# Patient Record
Sex: FEMALE | Race: WHITE
[De-identification: names, ages, dates, MRNs, and addresses within clinical notes are randomized per-mention and may not be internally consistent; named-entity substitution may affect disease eponyms.]

---

## 2017-08-13 ENCOUNTER — HOSPITAL ENCOUNTER (INPATIENT)
Dept: HOSPITAL 47 - EC | Age: 72
LOS: 4 days | Discharge: HOME HEALTH | DRG: 603 | End: 2017-08-17
Payer: MEDICARE

## 2017-08-13 VITALS — BODY MASS INDEX: 25.2 KG/M2

## 2017-08-13 DIAGNOSIS — E78.5: ICD-10-CM

## 2017-08-13 DIAGNOSIS — L02.31: Primary | ICD-10-CM

## 2017-08-13 DIAGNOSIS — L03.317: ICD-10-CM

## 2017-08-13 DIAGNOSIS — Z88.1: ICD-10-CM

## 2017-08-13 DIAGNOSIS — R73.9: ICD-10-CM

## 2017-08-13 DIAGNOSIS — Z88.2: ICD-10-CM

## 2017-08-13 DIAGNOSIS — E03.9: ICD-10-CM

## 2017-08-13 DIAGNOSIS — I10: ICD-10-CM

## 2017-08-13 DIAGNOSIS — Z79.899: ICD-10-CM

## 2017-08-13 LAB
ALP SERPL-CCNC: 79 U/L (ref 38–126)
ALT SERPL-CCNC: 29 U/L (ref 9–52)
ANION GAP SERPL CALC-SCNC: 13 MMOL/L
APTT BLD: 23.4 SEC (ref 22–30)
AST SERPL-CCNC: 35 U/L (ref 14–36)
BASOPHILS # BLD AUTO: 0 K/UL (ref 0–0.2)
BASOPHILS NFR BLD AUTO: 0 %
BUN SERPL-SCNC: 9 MG/DL (ref 7–17)
CALCIUM SPEC-MCNC: 9.3 MG/DL (ref 8.4–10.2)
CH: 31.9
CHCM: 34.1
CHLORIDE SERPL-SCNC: 103 MMOL/L (ref 98–107)
CK SERPL-CCNC: 69 U/L (ref 30–135)
CO2 SERPL-SCNC: 23 MMOL/L (ref 22–30)
EOSINOPHIL # BLD AUTO: 0.1 K/UL (ref 0–0.7)
EOSINOPHIL NFR BLD AUTO: 1 %
ERYTHROCYTE [DISTWIDTH] IN BLOOD BY AUTOMATED COUNT: 4.1 M/UL (ref 3.8–5.4)
ERYTHROCYTE [DISTWIDTH] IN BLOOD: 12.1 % (ref 11.5–15.5)
GLUCOSE SERPL-MCNC: 129 MG/DL (ref 74–99)
HCT VFR BLD AUTO: 38.5 % (ref 34–46)
HDW: 2.47
HGB BLD-MCNC: 13.3 GM/DL (ref 11.4–16)
INR PPP: 1 (ref ?–1.2)
LUC NFR BLD AUTO: 2 %
LYMPHOCYTES # SPEC AUTO: 0.5 K/UL (ref 1–4.8)
LYMPHOCYTES NFR SPEC AUTO: 5 %
MAGNESIUM SPEC-SCNC: 1.8 MG/DL (ref 1.6–2.3)
MCH RBC QN AUTO: 32.4 PG (ref 25–35)
MCHC RBC AUTO-ENTMCNC: 34.4 G/DL (ref 31–37)
MCV RBC AUTO: 93.9 FL (ref 80–100)
MONOCYTES # BLD AUTO: 0.5 K/UL (ref 0–1)
MONOCYTES NFR BLD AUTO: 5 %
NEUTROPHILS # BLD AUTO: 9.3 K/UL (ref 1.3–7.7)
NEUTROPHILS NFR BLD AUTO: 88 %
NON-AFRICAN AMERICAN GFR(MDRD): >60
PHOSPHATE SERPL-MCNC: 2.7 MG/DL (ref 2.5–4.5)
POTASSIUM SERPL-SCNC: 4.5 MMOL/L (ref 3.5–5.1)
PROT SERPL-MCNC: 7.1 G/DL (ref 6.3–8.2)
PT BLD: 10.6 SEC (ref 9–12)
SODIUM SERPL-SCNC: 139 MMOL/L (ref 137–145)
WBC # BLD AUTO: 0.17 10*3/UL
WBC # BLD AUTO: 10.7 K/UL (ref 3.8–10.6)
WBC (PEROX): 10.92

## 2017-08-13 PROCEDURE — 83036 HEMOGLOBIN GLYCOSYLATED A1C: CPT

## 2017-08-13 PROCEDURE — 85730 THROMBOPLASTIN TIME PARTIAL: CPT

## 2017-08-13 PROCEDURE — 87075 CULTR BACTERIA EXCEPT BLOOD: CPT

## 2017-08-13 PROCEDURE — 85610 PROTHROMBIN TIME: CPT

## 2017-08-13 PROCEDURE — 82550 ASSAY OF CK (CPK): CPT

## 2017-08-13 PROCEDURE — 93005 ELECTROCARDIOGRAM TRACING: CPT

## 2017-08-13 PROCEDURE — 87040 BLOOD CULTURE FOR BACTERIA: CPT

## 2017-08-13 PROCEDURE — 87186 SC STD MICRODIL/AGAR DIL: CPT

## 2017-08-13 PROCEDURE — 82553 CREATINE MB FRACTION: CPT

## 2017-08-13 PROCEDURE — 80053 COMPREHEN METABOLIC PANEL: CPT

## 2017-08-13 PROCEDURE — 87077 CULTURE AEROBIC IDENTIFY: CPT

## 2017-08-13 PROCEDURE — 80048 BASIC METABOLIC PNL TOTAL CA: CPT

## 2017-08-13 PROCEDURE — 96375 TX/PRO/DX INJ NEW DRUG ADDON: CPT

## 2017-08-13 PROCEDURE — 85025 COMPLETE CBC W/AUTO DIFF WBC: CPT

## 2017-08-13 PROCEDURE — 83605 ASSAY OF LACTIC ACID: CPT

## 2017-08-13 PROCEDURE — 87205 SMEAR GRAM STAIN: CPT

## 2017-08-13 PROCEDURE — 80202 ASSAY OF VANCOMYCIN: CPT

## 2017-08-13 PROCEDURE — 84100 ASSAY OF PHOSPHORUS: CPT

## 2017-08-13 PROCEDURE — 96365 THER/PROPH/DIAG IV INF INIT: CPT

## 2017-08-13 PROCEDURE — 87070 CULTURE OTHR SPECIMN AEROBIC: CPT

## 2017-08-13 PROCEDURE — 99285 EMERGENCY DEPT VISIT HI MDM: CPT

## 2017-08-13 PROCEDURE — 83735 ASSAY OF MAGNESIUM: CPT

## 2017-08-13 PROCEDURE — 36415 COLL VENOUS BLD VENIPUNCTURE: CPT

## 2017-08-13 PROCEDURE — 96367 TX/PROPH/DG ADDL SEQ IV INF: CPT

## 2017-08-13 NOTE — ED
General Adult HPI





- General


Chief complaint: Recheck/Abnormal Lab/Rx


Stated complaint: Back Pain


Time Seen by Provider: 08/13/17 13:49


Source: patient, RN notes reviewed, old records reviewed


Mode of arrival: ambulatory


Limitations: no limitations





- History of Present Illness


Initial comments: 





This is a 72-year-old female here for evaluation.  This patient's presenting 

for evaluation of back pain, erythema.  Cellulitis.  Patient does have abscess, 

started on antibiotics yesterday, no drainage.  No fevers.  This increasing 

redness to the area.  She has been taking antibiotics as prescribed with what 

she thinks worsening symptoms.





- Related Data


 Home Medications











 Medication  Instructions  Recorded  Confirmed


 


Alendronate Sodium [Fosamax] 70 mg PO TU 08/13/17 08/13/17


 


Atorvastatin [Lipitor] 10 mg PO WILKES 08/13/17 08/13/17


 


Cholecalciferol [Vitamin D3] 1,000 unit PO DAILY 08/13/17 08/13/17


 


Levothyroxine Sodium [Synthroid] 100 mcg PO MOTUWETHFR 08/13/17 08/13/17


 


Sulfamethox-Tmp 800-160Mg [Bactrim 1 tab PO BID 08/13/17 08/13/17





-160 mg]   


 


amLODIPine BESYLATE/BENAZEPRIL 1 cap PO DAILY 08/13/17 08/13/17





[amLODIPine BESYLATE/BENAZEPRIL   





5-20 mg]   











 Allergies











Allergy/AdvReac Type Severity Reaction Status Date / Time


 


sulfamethoxazole AdvReac  Abdominal Verified 08/13/17 14:30





[From Bactrim]   Pain  


 


trimethoprim [From Bactrim] AdvReac  Abdominal Verified 08/13/17 14:30





   Pain  














Review of Systems


ROS Statement: 


Those systems with pertinent positive or pertinent negative responses have been 

documented in the HPI.





ROS Other: All systems not noted in ROS Statement are negative.





Past Medical History


Past Medical History: Hyperlipidemia, Thyroid Disorder


History of Any Multi-Drug Resistant Organisms: None Reported


Past Surgical History: Back Surgery, Hysterectomy


Past Psychological History: No Psychological Hx Reported


Smoking Status: Never smoker


Past Alcohol Use History: None Reported


Past Drug Use History: None Reported





General Exam





- General Exam Comments


Initial Comments: 





This severe cellulitis with erythema and warmth to right posterior back


Limitations: no limitations


General appearance: alert, in no apparent distress


Head exam: Present: atraumatic, normocephalic, normal inspection


Eye exam: Present: normal appearance, PERRL, EOMI.  Absent: scleral icterus, 

conjunctival injection, periorbital swelling


ENT exam: Present: normal exam, mucous membranes moist


Neck exam: Present: normal inspection.  Absent: tenderness, meningismus, 

lymphadenopathy


Respiratory exam: Present: normal lung sounds bilaterally.  Absent: respiratory 

distress, wheezes, rales, rhonchi, stridor


Cardiovascular Exam: Present: regular rate, normal rhythm, normal heart sounds.

  Absent: systolic murmur, diastolic murmur, rubs, gallop, clicks


GI/Abdominal exam: Present: soft, normal bowel sounds.  Absent: distended, 

tenderness, guarding, rebound, rigid


Extremities exam: Present: normal inspection, full ROM, normal capillary 

refill.  Absent: tenderness, pedal edema, joint swelling, calf tenderness


Back exam: Present: normal inspection


Neurological exam: Present: alert, oriented X3, CN II-XII intact


Psychiatric exam: Present: normal affect, normal mood


Skin exam: Present: warm, dry, intact, normal color.  Absent: rash





Course


 Vital Signs











  08/13/17 08/13/17





  13:29 14:40


 


Temperature 98.1 F 98.2 F


 


Pulse Rate 98 92


 


Respiratory 20 16





Rate  


 


Blood Pressure 146/68 137/65


 


O2 Sat by Pulse 99 99





Oximetry  














- Reevaluation(s)


Reevaluation #1: 





08/13/17 15:22


Patient is in no acute distress





EKG Findings





- EKG Comments:


EKG Findings:: EKG shows normal sinus at a rate of 99, , QRS 86, 





Medical Decision Making





- Medical Decision Making





72 female the ER for evaluation.  Patient was is here for evaluation of back 

pain.  Worsening infection of back.  Patient was started on Bactrim yesterday 

for this abscess.  Patient's abscess is growing in size, erythematous growing 

in size and spreading around her back.  Patient will be admitted for IV 

antibiotics and surgical evaluation





- Lab Data


Result diagrams: 


 08/13/17 14:30





 08/13/17 14:30


 Lab Results











  08/13/17 08/13/17 08/13/17 Range/Units





  14:30 14:30 14:30 


 


WBC   10.7 H   (3.8-10.6)  k/uL


 


RBC   4.10   (3.80-5.40)  m/uL


 


Hgb   13.3   (11.4-16.0)  gm/dL


 


Hct   38.5   (34.0-46.0)  %


 


MCV   93.9   (80.0-100.0)  fL


 


MCH   32.4   (25.0-35.0)  pg


 


MCHC   34.4   (31.0-37.0)  g/dL


 


RDW   12.1   (11.5-15.5)  %


 


Plt Count   210   (150-450)  k/uL


 


Neutrophils %   88   %


 


Lymphocytes %   5   %


 


Monocytes %   5   %


 


Eosinophils %   1   %


 


Basophils %   0   %


 


Neutrophils #   9.3 H   (1.3-7.7)  k/uL


 


Lymphocytes #   0.5 L   (1.0-4.8)  k/uL


 


Monocytes #   0.5   (0-1.0)  k/uL


 


Eosinophils #   0.1   (0-0.7)  k/uL


 


Basophils #   0.0   (0-0.2)  k/uL


 


PT     (9.0-12.0)  sec


 


INR     (<1.2)  


 


APTT     (22.0-30.0)  sec


 


Sodium    139  (137-145)  mmol/L


 


Potassium    4.5  (3.5-5.1)  mmol/L


 


Chloride    103  ()  mmol/L


 


Carbon Dioxide    23  (22-30)  mmol/L


 


Anion Gap    13  mmol/L


 


BUN    9  (7-17)  mg/dL


 


Creatinine    0.70  (0.52-1.04)  mg/dL


 


Est GFR (MDRD) Af Amer    >60  (>60 ml/min/1.73 sqM)  


 


Est GFR (MDRD) Non-Af    >60  (>60 ml/min/1.73 sqM)  


 


Glucose    129 H  (74-99)  mg/dL


 


Plasma Lactic Acid Ryland     (0.7-2.0)  mmol/L


 


Calcium    9.3  (8.4-10.2)  mg/dL


 


Phosphorus    2.7  (2.5-4.5)  mg/dL


 


Magnesium    1.8  (1.6-2.3)  mg/dL


 


Total Bilirubin    1.2  (0.2-1.3)  mg/dL


 


AST    35  (14-36)  U/L


 


ALT    29  (9-52)  U/L


 


Alkaline Phosphatase    79  ()  U/L


 


Total Creatine Kinase  69    ()  U/L


 


CK-MB (CK-2)  0.6    (0.0-2.4)  ng/mL


 


CK-MB (CK-2) Rel Index  0.9    


 


Total Protein    7.1  (6.3-8.2)  g/dL


 


Albumin    4.1  (3.5-5.0)  g/dL














  08/13/17 08/13/17 Range/Units





  14:30 14:30 


 


WBC    (3.8-10.6)  k/uL


 


RBC    (3.80-5.40)  m/uL


 


Hgb    (11.4-16.0)  gm/dL


 


Hct    (34.0-46.0)  %


 


MCV    (80.0-100.0)  fL


 


MCH    (25.0-35.0)  pg


 


MCHC    (31.0-37.0)  g/dL


 


RDW    (11.5-15.5)  %


 


Plt Count    (150-450)  k/uL


 


Neutrophils %    %


 


Lymphocytes %    %


 


Monocytes %    %


 


Eosinophils %    %


 


Basophils %    %


 


Neutrophils #    (1.3-7.7)  k/uL


 


Lymphocytes #    (1.0-4.8)  k/uL


 


Monocytes #    (0-1.0)  k/uL


 


Eosinophils #    (0-0.7)  k/uL


 


Basophils #    (0-0.2)  k/uL


 


PT   10.6  (9.0-12.0)  sec


 


INR   1.0  (<1.2)  


 


APTT   23.4  (22.0-30.0)  sec


 


Sodium    (137-145)  mmol/L


 


Potassium    (3.5-5.1)  mmol/L


 


Chloride    ()  mmol/L


 


Carbon Dioxide    (22-30)  mmol/L


 


Anion Gap    mmol/L


 


BUN    (7-17)  mg/dL


 


Creatinine    (0.52-1.04)  mg/dL


 


Est GFR (MDRD) Af Amer    (>60 ml/min/1.73 sqM)  


 


Est GFR (MDRD) Non-Af    (>60 ml/min/1.73 sqM)  


 


Glucose    (74-99)  mg/dL


 


Plasma Lactic Acid Ryland  1.6   (0.7-2.0)  mmol/L


 


Calcium    (8.4-10.2)  mg/dL


 


Phosphorus    (2.5-4.5)  mg/dL


 


Magnesium    (1.6-2.3)  mg/dL


 


Total Bilirubin    (0.2-1.3)  mg/dL


 


AST    (14-36)  U/L


 


ALT    (9-52)  U/L


 


Alkaline Phosphatase    ()  U/L


 


Total Creatine Kinase    ()  U/L


 


CK-MB (CK-2)    (0.0-2.4)  ng/mL


 


CK-MB (CK-2) Rel Index    


 


Total Protein    (6.3-8.2)  g/dL


 


Albumin    (3.5-5.0)  g/dL














Disposition


Clinical Impression: 


 Cellulitis, Failure of outpatient treatment





Disposition: ADMITTED AS IP TO THIS HOSP


Condition: Good


Referrals: 


Patrick Evans DO [Primary Care Provider] - 1-2 days

## 2017-08-14 LAB
ANION GAP SERPL CALC-SCNC: 8 MMOL/L
BASOPHILS # BLD AUTO: 0 K/UL (ref 0–0.2)
BASOPHILS NFR BLD AUTO: 0 %
BUN SERPL-SCNC: 9 MG/DL (ref 7–17)
CALCIUM SPEC-MCNC: 8.3 MG/DL (ref 8.4–10.2)
CH: 32.4
CHCM: 33.4
CHLORIDE SERPL-SCNC: 107 MMOL/L (ref 98–107)
CO2 SERPL-SCNC: 25 MMOL/L (ref 22–30)
EOSINOPHIL # BLD AUTO: 0.1 K/UL (ref 0–0.7)
EOSINOPHIL NFR BLD AUTO: 2 %
ERYTHROCYTE [DISTWIDTH] IN BLOOD BY AUTOMATED COUNT: 3.6 M/UL (ref 3.8–5.4)
ERYTHROCYTE [DISTWIDTH] IN BLOOD: 12.8 % (ref 11.5–15.5)
GLUCOSE SERPL-MCNC: 83 MG/DL (ref 74–99)
HCT VFR BLD AUTO: 35 % (ref 34–46)
HDW: 2.47
HEMOGLOBIN A1C: 5.6 % (ref 4.2–6.1)
HGB BLD-MCNC: 11.2 GM/DL (ref 11.4–16)
LUC NFR BLD AUTO: 2 %
LYMPHOCYTES # SPEC AUTO: 0.9 K/UL (ref 1–4.8)
LYMPHOCYTES NFR SPEC AUTO: 14 %
MCH RBC QN AUTO: 31 PG (ref 25–35)
MCHC RBC AUTO-ENTMCNC: 31.9 G/DL (ref 31–37)
MCV RBC AUTO: 97.4 FL (ref 80–100)
MONOCYTES # BLD AUTO: 0.4 K/UL (ref 0–1)
MONOCYTES NFR BLD AUTO: 7 %
NEUTROPHILS # BLD AUTO: 4.6 K/UL (ref 1.3–7.7)
NEUTROPHILS NFR BLD AUTO: 75 %
NON-AFRICAN AMERICAN GFR(MDRD): >60
POTASSIUM SERPL-SCNC: 3.6 MMOL/L (ref 3.5–5.1)
SODIUM SERPL-SCNC: 140 MMOL/L (ref 137–145)
WBC # BLD AUTO: 0.13 10*3/UL
WBC # BLD AUTO: 6.1 K/UL (ref 3.8–10.6)
WBC (PEROX): 6.3

## 2017-08-14 RX ADMIN — ENOXAPARIN SODIUM SCH MG: 40 INJECTION SUBCUTANEOUS at 08:18

## 2017-08-14 RX ADMIN — PANTOPRAZOLE SODIUM SCH MG: 40 TABLET, DELAYED RELEASE ORAL at 08:18

## 2017-08-14 RX ADMIN — THERA TABS SCH EACH: TAB at 12:43

## 2017-08-14 RX ADMIN — CEFAZOLIN SCH MLS/HR: 330 INJECTION, POWDER, FOR SOLUTION INTRAMUSCULAR; INTRAVENOUS at 12:43

## 2017-08-14 RX ADMIN — SODIUM CHLORIDE SCH MLS/HR: 9 INJECTION, SOLUTION INTRAVENOUS at 00:05

## 2017-08-14 RX ADMIN — HYDROCODONE BITARTRATE AND ACETAMINOPHEN PRN EACH: 5; 325 TABLET ORAL at 16:25

## 2017-08-14 RX ADMIN — HYDROCODONE BITARTRATE AND ACETAMINOPHEN PRN EACH: 5; 325 TABLET ORAL at 08:31

## 2017-08-14 RX ADMIN — LEVOTHYROXINE SODIUM SCH MCG: 100 TABLET ORAL at 06:04

## 2017-08-14 RX ADMIN — HYDROMORPHONE HYDROCHLORIDE PRN MG: 1 INJECTION, SOLUTION INTRAMUSCULAR; INTRAVENOUS; SUBCUTANEOUS at 03:06

## 2017-08-14 RX ADMIN — LISINOPRIL SCH MG: 20 TABLET ORAL at 08:18

## 2017-08-14 RX ADMIN — Medication SCH UNIT: at 08:18

## 2017-08-14 RX ADMIN — SODIUM CHLORIDE SCH MLS/HR: 9 INJECTION, SOLUTION INTRAVENOUS at 12:43

## 2017-08-14 RX ADMIN — SODIUM CHLORIDE SCH MLS/HR: 9 INJECTION, SOLUTION INTRAVENOUS at 23:52

## 2017-08-14 NOTE — P.GSCN
History of Present Illness


Consult date: 08/14/17


Requesting physician: Tonja Diaz


History of present illness: 





72-year-old female who presented on the day of admission to the emergency room 

to be evaluated for a chief complaint of developing pain with redness involving 

the right buttocks onset August 7 patient stated it had initially started out 

small there was some redness  went to a retreat noted that it did not improve.  

Patient stated while she was on the retreat denied fever chills but did note 

that there was a significant amount of tenderness with increased redness to the 

site with no improvement


    Came back this past Saturday August 12th the area became increasingly more 

tender red and swollen and hard involving the right buttock extending into the 

back   presented to the urgent care clinic who put the patient on Bactrim.  

Patient stated at the clinic they did do wound cultures.  Patient stated the 

Bactrim "made her feel sick"   became concerned presented to the emergency room 

with no noted improvement   the right buttocks cellulitis abscess area was 

increasing in size.  Patient denied any injury denied any prior episodes.  The 

right buttocks dressing moderate amount of soupy creamy secretions noted on the 

dressing no odor noted palpable firm abscess noted.  Area red positive 

tenderness.  Patient was seen in the emergency room white count was 10.7 

afebrile.  No skin rash noted.  Patient gives no significant past medical 

history is not a diabetic there is no significant past surgical history.





Patient does give a history of July this year on the left Nare developed a sore 

inside the nose "it spontaneously drained a large amount of nasal secretions. 

went away on its own "no further episodes





Review of Systems





Essentially unremarkable except as mentioned in the present illness





Past Medical History


Past Medical History: Hyperlipidemia, Hypertension, Thyroid Disorder


History of Any Multi-Drug Resistant Organisms: None Reported


Past Surgical History: Back Surgery, Hysterectomy


Past Anesthesia/Blood Transfusion Reactions: No Reported Reaction


Past Psychological History: No Psychological Hx Reported


Smoking Status: Never smoker


Past Alcohol Use History: None Reported


Past Drug Use History: None Reported





Medications and Allergies


 Home Medications











 Medication  Instructions  Recorded  Confirmed  Type


 


Alendronate Sodium [Fosamax] 70 mg PO TU 08/13/17 08/13/17 History


 


Atorvastatin [Lipitor] 10 mg PO WILKES 08/13/17 08/13/17 History


 


Cholecalciferol [Vitamin D3] 1,000 unit PO DAILY 08/13/17 08/13/17 History


 


Levothyroxine Sodium [Synthroid] 100 mcg PO MOTUWETHFR 08/13/17 08/13/17 History


 


Sulfamethox-Tmp 800-160Mg [Bactrim 1 tab PO BID 08/13/17 08/13/17 History





-160 mg]    


 


amLODIPine BESYLATE/BENAZEPRIL 1 cap PO DAILY 08/13/17 08/13/17 History





[amLODIPine BESYLATE/BENAZEPRIL    





5-20 mg]    











 Allergies











Allergy/AdvReac Type Severity Reaction Status Date / Time


 


sulfamethoxazole AdvReac  Abdominal Verified 08/13/17 14:30





[From Bactrim]   Pain  


 


trimethoprim [From Bactrim] AdvReac  Abdominal Verified 08/13/17 14:30





   Pain  














Surgical - Exam


 Vital Signs











Temp Pulse Resp BP Pulse Ox


 


 98.1 F   98   20   146/68   99 


 


 08/13/17 13:29  08/13/17 13:29  08/13/17 13:29  08/13/17 13:29  08/13/17 13:29














GENERAL APPEARANCE: 72-year-old female patient is alert, oriented, in no acute 

distress.  Pleasant cooperative


VITAL SIGNS: Reviewed


HEENT: Head is normocephalic and atraumatic. Pupils are equal and reactive. The 

nares are patent. Oropharynx is clear without lesions.


NECK: Supple without lymphadenopathy. Traches midline.


HEART: S1, S2. Regular rate and rhythm.  No murmur noted denying chest pain 

when questioning


LUNGS: No crackles or wheezes are heard.  Adequate air movement bilaterally on 

room air sats 94% no shortness of breath noted


ABDOMEN: Soft, nontender, nondistended with good bowel sounds. No peritoneal 

signs. No palpable organomegaly or masses.  No reports of nausea vomiting 

urinating no difficulty denying any burning on urination frequency urgency


EXTREMITIES: Normal skin color and turgor. No cyanosis, rash, ulceration, 

clubbing or edema. Radial pedal pulses are 2/4 bilaterally.


NEUROLOGICAL: No focal deficits. Strength and sensation are grossly intact.


Skin right buttocks extending into the sacral area firm red positive tenderness 

abscess with a moderate amount.  Drainage from the center of the wound 








Results





- Labs





 08/14/17 07:56





 08/14/17 07:56


 Abnormal Lab Results - Last 24 Hours (Table)











  08/13/17 08/13/17 08/14/17 Range/Units





  14:30 14:30 07:56 


 


WBC  10.7 H    (3.8-10.6)  k/uL


 


RBC    3.60 L  (3.80-5.40)  m/uL


 


Hgb    11.2 L  (11.4-16.0)  gm/dL


 


Neutrophils #  9.3 H    (1.3-7.7)  k/uL


 


Lymphocytes #  0.5 L   0.9 L  (1.0-4.8)  k/uL


 


Glucose   129 H   (74-99)  mg/dL


 


Calcium     (8.4-10.2)  mg/dL














  08/14/17 Range/Units





  07:56 


 


WBC   (3.8-10.6)  k/uL


 


RBC   (3.80-5.40)  m/uL


 


Hgb   (11.4-16.0)  gm/dL


 


Neutrophils #   (1.3-7.7)  k/uL


 


Lymphocytes #   (1.0-4.8)  k/uL


 


Glucose   (74-99)  mg/dL


 


Calcium  8.3 L  (8.4-10.2)  mg/dL








 Diabetes panel











  08/13/17 08/14/17 Range/Units





  14:30 07:56 


 


Sodium  139  140  (137-145)  mmol/L


 


Potassium  4.5  3.6  (3.5-5.1)  mmol/L


 


Chloride  103  107  ()  mmol/L


 


Carbon Dioxide  23  25  (22-30)  mmol/L


 


BUN  9  9  (7-17)  mg/dL


 


Creatinine  0.70  0.71  (0.52-1.04)  mg/dL


 


Glucose  129 H  83  (74-99)  mg/dL


 


Calcium  9.3  8.3 L  (8.4-10.2)  mg/dL


 


AST  35   (14-36)  U/L


 


ALT  29   (9-52)  U/L


 


Alkaline Phosphatase  79   ()  U/L


 


Total Protein  7.1   (6.3-8.2)  g/dL


 


Albumin  4.1   (3.5-5.0)  g/dL








 Calcium panel











  08/13/17 08/14/17 Range/Units





  14:30 07:56 


 


Calcium  9.3  8.3 L  (8.4-10.2)  mg/dL


 


Phosphorus  2.7   (2.5-4.5)  mg/dL


 


Albumin  4.1   (3.5-5.0)  g/dL








 Pituitary panel











  08/13/17 08/14/17 Range/Units





  14:30 07:56 


 


Sodium  139  140  (137-145)  mmol/L


 


Potassium  4.5  3.6  (3.5-5.1)  mmol/L


 


Chloride  103  107  ()  mmol/L


 


Carbon Dioxide  23  25  (22-30)  mmol/L


 


BUN  9  9  (7-17)  mg/dL


 


Creatinine  0.70  0.71  (0.52-1.04)  mg/dL


 


Glucose  129 H  83  (74-99)  mg/dL


 


Calcium  9.3  8.3 L  (8.4-10.2)  mg/dL








 Adrenal panel











  08/13/17 08/14/17 Range/Units





  14:30 07:56 


 


Sodium  139  140  (137-145)  mmol/L


 


Potassium  4.5  3.6  (3.5-5.1)  mmol/L


 


Chloride  103  107  ()  mmol/L


 


Carbon Dioxide  23  25  (22-30)  mmol/L


 


BUN  9  9  (7-17)  mg/dL


 


Creatinine  0.70  0.71  (0.52-1.04)  mg/dL


 


Glucose  129 H  83  (74-99)  mg/dL


 


Calcium  9.3  8.3 L  (8.4-10.2)  mg/dL


 


Total Bilirubin  1.2   (0.2-1.3)  mg/dL


 


AST  35   (14-36)  U/L


 


ALT  29   (9-52)  U/L


 


Alkaline Phosphatase  79   ()  U/L


 


Total Protein  7.1   (6.3-8.2)  g/dL


 


Albumin  4.1   (3.5-5.0)  g/dL














Assessment and Plan


Plan: 





 impression


Present on admission right body extending into the sacral area abscess new-onset


 hypertension essential


Hypothyroid on supplements


Present on admission leukocytosis suspect reactive











Plan


[facility]


Consult infectious disease Dr. Davidson recommendations pending


Pain control


DVT and GI prophylaxis


Resume home meds as appropriate


IV for hydration


Continue IV vancomycin








Further surgical recommendations pending





Thank you for allowing us to participate in the surgical management of your 

patient further surgical recommendations pending will follow the clinical 

course closely








The above impression and plan of care have been discussed and directed by 

signing physician. Verna Van nurse practitioner acting as scribe for signing 

physician.

## 2017-08-14 NOTE — HP
The chief complaints are pain and swelling and erythema and discharge on the 
right lower back.



HISTORY OF PRESENT ILLNESS;  This is a 72-year-old woman with a past medical 
history of multiple medical problems, hypertension, hyperlipidemia, 
hyperthyroidism, history of back surgery, being followed by prior physician 
elsewhere as recently moved to the area.  The patient would like to see Dr. Patrick Evans in the outpatient setting.  The patient is complaining of pain and 
swelling in the right lower quadrant.  Patient was taking outpatient Bactrim 
because of lack of improvement.  Patient came to [facility], admitted 
for further evaluation and treatment.  There is no history of any fever, rigors
, chills.  Patient was started on IV vancomycin at this time.  Patient has 
taken Bactrim as mentioned earlier.



PAST MEDICAL HISTORY:  History of hypertension, hyperlipidemia, hypothyroidism.



Medications prior to admission include:

1.  Amlodipine.

2.  Benazepril 5-20 p.o. daily.

3.  Bactrim DS 1 p.o. b.i.d.

4.  Synthroid 100 mcg Monday, Tuesday, Wednesday, Thursday, Friday.

5.  Vitamin D3 one thousand daily.

5.  Lipitor 10 mg daily.

6.  Fosamax 70 mg Thursday.



Allergies are BACTRIM.



FAMILY HISTORY:  No history of heart disease or strokes in the family.



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 
alcohol.



REVIEW OF SYSTEMS:

ENT:  No diminished hearing or diminished vision.

CARDIOVASCULAR SYSTEM:  No angina, no palpitations.

RESPIRATORY SYSTEM:  No cough.

GI:  No nausea.

:  No dysuria.

NERVOUS SYSTEM:  No numbness or weakness.

ALLERGY/IMMUNOLOGY:  No asthma or hayfever.

MUSCULOSKELETAL:  As mentioned earlier.

HEMATOLOGY:  No history of anemia.

ENDOCRINE:  No history of diabetes mellitus, hypothyroid.

CONSTITUTIONAL:  As mentioned earlier.

DERMATOLOGY:  Negative.

RHEUMATOLOGY:  Negative.

PSYCHIATRY:  As mentioned earlier.



PHYSICAL EXAMINATION:  

Alert and oriented x3.  Pulse is 92, blood pressure 120/72, respirations 20, 
temperature is 98.4, pulse ox 96% on room air.

HEENT:  Conjunctivae normal, oral mucosa moist.

NECK:  No jugular venous distension, no lymph node enlargement.  

RESPIRATORY:  Breath sounds diminished at the bases, no rhonchi, no crackles.  

ABDOMEN:  Soft, nontender, no mass palpable.

LEGS:  No edema, no swelling.

NERVOUS SYSTEM:  Higher functions as mentioned, moves all 4 limbs.  No focal 
motor deficits.

LYMPHATICS:  No lymph node enlargement in the neck, axillae or groin.

SKIN:  Significant erythema, tenderness and discharge from the right lower 
lateral part of the back present.  



The lab investigations are WBC 10.7 and glucose 129.



ASSESSMENT:

1.  Abscess and cellulitis of the right lower part with systemic inflammatory 
response syndrome.

2.  Increased WBC.

3.  Increased random blood sugar.

4.  Hypertension.

5.  Hyperlipidemia.

6.  Hypothyroidism.

7.  Back Surgery.



RECOMMENDATION:  In this 72-year-old woman who presented with multiple complex 
medical issues, will monitor the patient closely.  Continue with the current 
medications.  Continue with IV vancomycin.  Cultures, DVT prophylaxis, GI 
prophylaxis. I would also recommend Surgical evaluation.  Resume the home 
medications.  Guarded prognosis.  Further recommendations to follow.  
MTDD

## 2017-08-14 NOTE — P.CONS
History of Present Illness





- Reason for Consult


Consult date: 08/14/17


Abscess, cellulitis





- History of Present Illness





This is a 72-year-old  female.  She states she was driving to 

[employer] on August 7 and noticed that there was something bothering her at her 

waistband on her back right side.  The area was reddened and very tender and 

continued to get bigger over the past week.  She went to [facility] and was 

diagnosed with cellulitis and started on Bactrim.  She states that after she 

started Bactrim she had diarrhea, headache and nausea and the area on her back 

continued to worsen with increased redness, swelling and drainage.  She came 

into [facility] emergency center on August 13 for evaluation.  

She was found to be afebrile with white count of 10.7.  Blood cultures status 

received.  She was started on vancomycin and admitted to the [facility] floor.  

Consult with general surgeon, Dr. Marino is in place.  Patient states this 

started out as just a hot pimple.  She also has had chills and sweats are itchy 

denies any previous history of MRSA and is no previous abscess.





Review of Systems


All systems: negative


Constitutional: Reports chills, Reports fatigue, Reports fever, Reports malaise

, Reports sweats


Eyes: denies blurred vision, denies pain, denies loss of vision


Ears, nose, mouth and throat: Denies dental pain, Denies headache, Denies mouth 

pain, Denies sore throat, Denies vertigo


Cardiovascular: Denies chest pain, Denies dyspnea on exertion, Denies edema, 

Denies leg edema, Denies lightheadedness, Denies shortness of breath, Denies 

syncope


Respiratory: Denies cough, Denies cough with sputum, Denies dyspnea, Denies 

excessive sputum, Denies hemoptysis


Gastrointestinal: Denies abdominal pain, Denies diarrhea, Denies nausea, Denies 

vomiting


Genitourinary: Denies dysuria, Denies hematuria, Denies urgency, Denies urinary 

frequency


Musculoskeletal: Denies muscle weakness, Denies myalgias


Integumentary: Reports wounds, Denies pruritus, Denies rash


Neurological: Denies numbness, Denies weakness


Psychiatric: Denies anxiety, Denies depression


Endocrine: Denies fatigue, Denies weight change





Past Medical History


Past Medical History: Hyperlipidemia, Hypertension, Thyroid Disorder


History of Any Multi-Drug Resistant Organisms: None Reported


Past Surgical History: Back Surgery, Hysterectomy


Past Anesthesia/Blood Transfusion Reactions: No Reported Reaction


Past Psychological History: No Psychological Hx Reported


Smoking Status: Never smoker


Past Alcohol Use History: None Reported


Additional Past Alcohol Use History / Comment(s): Patient is a lifelong 

nonsmoker, no marijuana, medical marijuana, street drug or alcohol use.  She 

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  There is a cat in 

the home and outdoor rabbits.


Past Drug Use History: None Reported





Medications and Allergies


 Home Medications











 Medication  Instructions  Recorded  Confirmed  Type


 


Alendronate Sodium [Fosamax] 70 mg PO TU 08/13/17 08/13/17 History


 


Atorvastatin [Lipitor] 10 mg PO WILKES 08/13/17 08/13/17 History


 


Cholecalciferol [Vitamin D3] 1,000 unit PO DAILY 08/13/17 08/13/17 History


 


Levothyroxine Sodium [Synthroid] 100 mcg PO MOTUWETHFR 08/13/17 08/13/17 History


 


Sulfamethox-Tmp 800-160Mg [Bactrim 1 tab PO BID 08/13/17 08/13/17 History





-160 mg]    


 


amLODIPine BESYLATE/BENAZEPRIL 1 cap PO DAILY 08/13/17 08/13/17 History





[amLODIPine BESYLATE/BENAZEPRIL    





5-20 mg]    











 Allergies











Allergy/AdvReac Type Severity Reaction Status Date / Time


 


sulfamethoxazole AdvReac  Abdominal Verified 08/13/17 14:30





[From Bactrim]   Pain  


 


trimethoprim [From Bactrim] AdvReac  Abdominal Verified 08/13/17 14:30





   Pain  














Physical Exam


Vitals: 


 Vital Signs











  Temp Pulse Pulse Resp BP BP Pulse Ox


 


 08/14/17 07:00  99.4 F   78  16   101/53  94 L


 


 08/13/17 23:24  99.1 F   76  16   107/57  98


 


 08/13/17 19:01  98.1 F   92  20   121/72  96


 


 08/13/17 15:31   92   18  129/60   97


 


 08/13/17 14:40  98.2 F  92   16  137/65   99


 


 08/13/17 13:29  98.1 F  98   20  146/68   99








 Intake and Output











 08/13/17 08/14/17 08/14/17





 22:59 06:59 14:59


 


Intake Total  1500 


 


Balance  1500 


 


Intake:   


 


  Intake, IV Titration  1500 





  Amount   


 


    Sodium Chloride 0.9% 1,  1000 





    000 ml @ 100 mls/hr IV .   





    Q10H [facility] Rx#:[id number]   


 


    Vancomycin 1,500 mg In  250 





    Sodium Chloride 0.9% 250   





    ml @ 125 mls/hr IVPB ONCE   





    [facility] Rx#:[id number]   


 


    Vancomycin 1,500 mg In  250 





    Sodium Chloride 0.9% 250   





    ml @ 125 mls/hr IVPB Q12H   





    [facility] Rx#:[id number]   


 


Other:   


 


  Voiding Method  Toilet 


 


  Weight 75.296 kg  

















Gen: This is a 72-year-old  female.  She is sitting up in bed and 

appears to be in no acute distress.


HEENT: Head is atraumatic, normocephalic. Pupils equal, round. Sclerae is 

anicteric.  Conjunctiva pink.


NECK: Supple. No JVD. No lymphadenopathy. No thyromegaly. 


LUNGS: Clear to auscultation. No wheezes or rhonchi.  No intercostal 

retractions.


HEART: Regular rate and rhythm. No murmur. 


ABDOMEN: Soft. Bowel sounds are present. No masses.  No tenderness.  There is a 

large area of erythema and edema with purulent drainage from the right 

posterior flank area with extreme tenderness.


EXTREMITIES: No pedal edema.  No calf tenderness.  Dorsalis pedis +2 bilaterally


NEUROLOGICAL: Patient is awake, alert and oriented x3. Cranial nerves 2 through 

12 are grossly intact. 








Results


Results: 





 Laboratory Results











WBC  6.1 k/uL (3.8-10.6)   08/14/17  07:56    


 


RBC  3.60 m/uL (3.80-5.40)  L  08/14/17  07:56    


 


Hgb  11.2 gm/dL (11.4-16.0)  L  08/14/17  07:56    


 


Hct  35.0 % (34.0-46.0)   08/14/17  07:56    


 


MCV  97.4 fL (80.0-100.0)   08/14/17  07:56    


 


MCH  31.0 pg (25.0-35.0)   08/14/17  07:56    


 


MCHC  31.9 g/dL (31.0-37.0)   08/14/17  07:56    


 


RDW  12.8 % (11.5-15.5)   08/14/17  07:56    


 


Plt Count  197 k/uL (150-450)   08/14/17  07:56    


 


Neutrophils %  75 %  08/14/17  07:56    


 


Lymphocytes %  14 %  08/14/17  07:56    


 


Monocytes %  7 %  08/14/17  07:56    


 


Eosinophils %  2 %  08/14/17  07:56    


 


Basophils %  0 %  08/14/17  07:56    


 


Neutrophils #  4.6 k/uL (1.3-7.7)   08/14/17  07:56    


 


Lymphocytes #  0.9 k/uL (1.0-4.8)  L  08/14/17  07:56    


 


Monocytes #  0.4 k/uL (0-1.0)   08/14/17  07:56    


 


Eosinophils #  0.1 k/uL (0-0.7)   08/14/17  07:56    


 


Basophils #  0.0 k/uL (0-0.2)   08/14/17  07:56    


 


PT  10.6 sec (9.0-12.0)   08/13/17  14:30    


 


INR  1.0  (<1.2)   08/13/17  14:30    


 


APTT  23.4 sec (22.0-30.0)   08/13/17  14:30    


 


Sodium  140 mmol/L (137-145)   08/14/17  07:56    


 


Potassium  3.6 mmol/L (3.5-5.1)   08/14/17  07:56    


 


Chloride  107 mmol/L ()   08/14/17  07:56    


 


Carbon Dioxide  25 mmol/L (22-30)   08/14/17  07:56    


 


Anion Gap  8 mmol/L  08/14/17  07:56    


 


BUN  9 mg/dL (7-17)   08/14/17  07:56    


 


Creatinine  0.71 mg/dL (0.52-1.04)   08/14/17  07:56    


 


Est GFR (MDRD) Af Amer  >60  (>60 ml/min/1.73 sqM)   08/14/17  07:56    


 


Est GFR (MDRD) Non-Af  >60  (>60 ml/min/1.73 sqM)   08/14/17  07:56    


 


Glucose  83 mg/dL (74-99)   08/14/17  07:56    


 


Plasma Lactic Acid Ryland  1.6 mmol/L (0.7-2.0)   08/13/17  14:30    


 


Calcium  8.3 mg/dL (8.4-10.2)  L  08/14/17  07:56    


 


Phosphorus  2.7 mg/dL (2.5-4.5)   08/13/17  14:30    


 


Magnesium  1.8 mg/dL (1.6-2.3)   08/13/17  14:30    


 


Total Bilirubin  1.2 mg/dL (0.2-1.3)   08/13/17  14:30    


 


AST  35 U/L (14-36)   08/13/17  14:30    


 


ALT  29 U/L (9-52)   08/13/17  14:30    


 


Alkaline Phosphatase  79 U/L ()   08/13/17  14:30    


 


Total Creatine Kinase  69 U/L ()   08/13/17  14:30    


 


CK-MB (CK-2)  0.6 ng/mL (0.0-2.4)   08/13/17  14:30    


 


CK-MB (CK-2) Rel Index  0.9   08/13/17  14:30    


 


Total Protein  7.1 g/dL (6.3-8.2)   08/13/17  14:30    


 


Albumin  4.1 g/dL (3.5-5.0)   08/13/17  14:30    











CBC & Chem 7: 


 08/14/17 07:56





 08/14/17 07:56


Labs: 


 Abnormal Lab Results - Last 24 Hours (Table)











  08/13/17 08/13/17 08/14/17 Range/Units





  14:30 14:30 07:56 


 


WBC  10.7 H    (3.8-10.6)  k/uL


 


RBC    3.60 L  (3.80-5.40)  m/uL


 


Hgb    11.2 L  (11.4-16.0)  gm/dL


 


Neutrophils #  9.3 H    (1.3-7.7)  k/uL


 


Lymphocytes #  0.5 L   0.9 L  (1.0-4.8)  k/uL


 


Glucose   129 H   (74-99)  mg/dL


 


Calcium     (8.4-10.2)  mg/dL














  08/14/17 Range/Units





  07:56 


 


WBC   (3.8-10.6)  k/uL


 


RBC   (3.80-5.40)  m/uL


 


Hgb   (11.4-16.0)  gm/dL


 


Neutrophils #   (1.3-7.7)  k/uL


 


Lymphocytes #   (1.0-4.8)  k/uL


 


Glucose   (74-99)  mg/dL


 


Calcium  8.3 L  (8.4-10.2)  mg/dL














Assessment and Plan


Plan: 





This is a 72-year-old  female who presents to the hospital with sepsis 

and soft tissue abscess and cellulitis to the right posterior flank area that 

failed outpatient treatment.  Patient is currently on vancomycin with some 

improvement.  Consult in place for a general surgeon for surgical intervention.

  Wound cultures will be obtained and culture report from [facility] will be 

obtained.  Blood cultures status received.  Further recommendations as patient 

progresses.





The above dictated assessment and findings were discussed with Dr. Davidson.  The 

impression and plan of care have been directed as dictated.  Gayle Kelly nurse 

practitioner acting as scribe for Dr. Davidson.

## 2017-08-14 NOTE — P.CON
Consult Note





- .


Consult date: 08/14/17


Assessment/Plan:: 





This is a 72-year-old  female.  She states she was driving to 

[employer] on August 7 and noticed that there was something bothering her at her 

waistband on her back right side.  The area was reddened and very tender and 

continued to get bigger over the past week.  She went to [facility] and was 

diagnosed with cellulitis and started on Bactrim.  She states that after she 

started Bactrim she had diarrhea, headache and nausea and the area on her back 

continued to worsen with increased redness, swelling and drainage.  She came 

into [facility] emergency center on August 13 for evaluation.  

She was found to be afebrile with white count of 10.7.  Blood cultures status 

received.  She was started on vancomycin and admitted to the [facility] floor.  

Consult with general surgeon, Dr. Marino is in place.  Patient states this 

started out as just a hot pimple.  She also has had chills and sweats are itchy 

denies any previous history of MRSA and is no previous abscess.  Please see the 

consult note is dictated by nurse practitioner Mrs. Gayle Kelly.


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 

potential contacts with bacteria that could easily cause the current abscess.  

She does relate that while she was [place] [employer] [place] she developed a pimple 

on her nose that eventually drained.  Now about a week later developed the 

abscess to her left flank.  They're likely related.  She however does not have 

evidence of diabetes.  She is quite miserable.  Is being seen by the surgeon.  

Will need surgical incision and drainage of the site for resolution.  Depending 

on its size may also do well once infection is improved with the wound VAC or 

advanced dressings depending on the overall size of the abscess.  Antibiotic 

therapy as noted vancomycin and Ancef is added until cultures are available.  

Local wound care is dry dressing for drainage control.  Pain control.  I agree 

with evaluation, assessment and plan as dictated by nurse practitioner Mrs. Gayle Kelly.

## 2017-08-15 LAB
ANION GAP SERPL CALC-SCNC: 13 MMOL/L
BASOPHILS # BLD AUTO: 0 K/UL (ref 0–0.2)
BASOPHILS NFR BLD AUTO: 0 %
BUN SERPL-SCNC: 8 MG/DL (ref 7–17)
CALCIUM SPEC-MCNC: 8.8 MG/DL (ref 8.4–10.2)
CH: 31.6
CHCM: 33.9
CHLORIDE SERPL-SCNC: 110 MMOL/L (ref 98–107)
CO2 SERPL-SCNC: 18 MMOL/L (ref 22–30)
EOSINOPHIL # BLD AUTO: 0.2 K/UL (ref 0–0.7)
EOSINOPHIL NFR BLD AUTO: 2 %
ERYTHROCYTE [DISTWIDTH] IN BLOOD BY AUTOMATED COUNT: 3.78 M/UL (ref 3.8–5.4)
ERYTHROCYTE [DISTWIDTH] IN BLOOD: 12.1 % (ref 11.5–15.5)
GLUCOSE SERPL-MCNC: 84 MG/DL (ref 74–99)
HCT VFR BLD AUTO: 35.4 % (ref 34–46)
HDW: 2.61
HGB BLD-MCNC: 12.4 GM/DL (ref 11.4–16)
LUC NFR BLD AUTO: 3 %
LYMPHOCYTES # SPEC AUTO: 1 K/UL (ref 1–4.8)
LYMPHOCYTES NFR SPEC AUTO: 16 %
MCH RBC QN AUTO: 32.8 PG (ref 25–35)
MCHC RBC AUTO-ENTMCNC: 35.1 G/DL (ref 31–37)
MCV RBC AUTO: 93.7 FL (ref 80–100)
MONOCYTES # BLD AUTO: 0.4 K/UL (ref 0–1)
MONOCYTES NFR BLD AUTO: 7 %
NEUTROPHILS # BLD AUTO: 4.4 K/UL (ref 1.3–7.7)
NEUTROPHILS NFR BLD AUTO: 72 %
NON-AFRICAN AMERICAN GFR(MDRD): >60
POTASSIUM SERPL-SCNC: 3.9 MMOL/L (ref 3.5–5.1)
SODIUM SERPL-SCNC: 141 MMOL/L (ref 137–145)
WBC # BLD AUTO: 0.16 10*3/UL
WBC # BLD AUTO: 6.1 K/UL (ref 3.8–10.6)
WBC (PEROX): 6.13

## 2017-08-15 PROCEDURE — 0H98XZX DRAINAGE OF BUTTOCK SKIN, EXTERNAL APPROACH, DIAGNOSTIC: ICD-10-PCS

## 2017-08-15 RX ADMIN — HYDROMORPHONE HYDROCHLORIDE PRN MG: 1 INJECTION, SOLUTION INTRAMUSCULAR; INTRAVENOUS; SUBCUTANEOUS at 12:08

## 2017-08-15 RX ADMIN — PANTOPRAZOLE SODIUM SCH MG: 40 TABLET, DELAYED RELEASE ORAL at 08:26

## 2017-08-15 RX ADMIN — THERA TABS SCH: TAB at 12:09

## 2017-08-15 RX ADMIN — LISINOPRIL SCH MG: 20 TABLET ORAL at 08:26

## 2017-08-15 RX ADMIN — LEVOTHYROXINE SODIUM SCH MCG: 100 TABLET ORAL at 05:38

## 2017-08-15 RX ADMIN — CEFAZOLIN SCH MLS/HR: 330 INJECTION, POWDER, FOR SOLUTION INTRAMUSCULAR; INTRAVENOUS at 08:29

## 2017-08-15 RX ADMIN — Medication SCH: at 08:26

## 2017-08-15 RX ADMIN — ENOXAPARIN SODIUM SCH: 40 INJECTION SUBCUTANEOUS at 08:26

## 2017-08-15 RX ADMIN — SODIUM CHLORIDE SCH MLS/HR: 9 INJECTION, SOLUTION INTRAVENOUS at 12:09

## 2017-08-15 RX ADMIN — SODIUM CHLORIDE SCH MLS/HR: 9 INJECTION, SOLUTION INTRAVENOUS at 23:54

## 2017-08-15 NOTE — P.PN
Subjective





72-year-old female being seen and examined this morning.  There is increased 

pain redness and tenderness at the right hip and buttocks.  Patients being 

followed by infectious disease.  Blood cultures are pending.  Running a low-

grade temp this morning of 99.1   there is an increase in the redness from the 

reference markings from the day before    currently on IV vancomycin per 

infectious diseases recommendations patient reports having increased pain in 

the right buttocks





Objective





- Vital Signs


Vital signs: 


 Vital Signs











Temp  99.1 F   08/15/17 07:00


 


Pulse  70   08/15/17 07:00


 


Resp  14   08/15/17 07:00


 


BP  116/72   08/15/17 07:00


 


Pulse Ox  96   08/15/17 07:00








 Intake & Output











 08/14/17 08/15/17 08/15/17





 18:59 06:59 18:59


 


Intake Total  120 


 


Balance  120 


 


Intake:   


 


  Oral  120 


 


Other:   


 


  Voiding Method Toilet  


 


  # Voids 2 1 














- Exam





Physical exam


72-year-old female being seen this morning patient reports having increased 

pain the right hip site with more redness and more tenderness more drainage 

noted on the dressing


Lungs are essentially clear with adequate air movement


Heart S1-S2 audible and regular


Abdomen soft nontender reports no nausea vomiting


Extremities no edema noted





- Labs


CBC & Chem 7: 


 08/15/17 10:54





 08/15/17 10:54


Labs: 


 Abnormal Lab Results - Last 24 Hours (Table)











  08/15/17 08/15/17 Range/Units





  10:54 10:54 


 


RBC  3.78 L   (3.80-5.40)  m/uL


 


Chloride   110 H  ()  mmol/L


 


Carbon Dioxide   18 L  (22-30)  mmol/L








 Microbiology - Last 24 Hours (Table)











 08/14/17 10:15 Gram Stain - Preliminary





 Back Wound Culture - Preliminary





    Presumptive Staph aureus


 


 08/13/17 14:30 Blood Culture - Preliminary





 Blood    No Growth after 24 hours


 


 08/14/17 10:15 Anaerobic Culture - Preliminary





 Back 














Assessment and Plan


Plan: 





 impression


Present on admission febrile leukocytosis sepsis suspect due to right buttock 

abscess new onset


 hypertension essential


Hypothyroid on supplements


Present on admission right posterior buttocks area extending into right flank 

with increase redness and tenderness with purulent drainage











Plan


Patient will be scheduled this afternoon at 5:00 for an incision and drainage 

by surgical service


Consult infectious disease Dr. Davidson recommendations noted


Pain control


DVT and GI prophylaxis


Resume home meds as appropriate


IV for hydration


Continue IV vancomycin








The above impression and plan of care have been discussed and directed by 

signing physician. Verna Van nurse practitioner acting as scribe for signing 

physician.

## 2017-08-15 NOTE — P.OP
Date of Procedure: 08/15/17


Preoperative Diagnosis: 


Right flank abscess


Postoperative Diagnosis: 


Right flank abscess


Procedure(s) Performed: 


Incision and drainage of right flank abscess


Implants: 





Anesthesia: MAC


Surgeon: Azam Marino


Estimated Blood Loss (ml): 5


Pathology: other (Culture)


Condition: stable


Disposition: PACU


Indications for Procedure: 





Operative Findings: 





Description of Procedure: 


A shunt placed on the operative table in the lateral position.  Her right flank 

was prepped and draped usual fashion.  The patient had a small area necrosis.  

The skin was anesthetized 1% local Xylocaine.  Then a skin incision was made.  

There is a purulent pocket of pus.  This was Cultured.  The wound was then 

packed.  We'll measured prostate 10 x 5 x 5 cm.  The wound was packed with 

Kerlix wet-to-dry.  Patient top she will was sent to recovery in stable 

condition.

## 2017-08-16 LAB
ANION GAP SERPL CALC-SCNC: 8 MMOL/L
BASOPHILS # BLD AUTO: 0 K/UL (ref 0–0.2)
BASOPHILS NFR BLD AUTO: 0 %
BUN SERPL-SCNC: 8 MG/DL (ref 7–17)
CALCIUM SPEC-MCNC: 8.5 MG/DL (ref 8.4–10.2)
CH: 32.2
CHCM: 33.4
CHLORIDE SERPL-SCNC: 106 MMOL/L (ref 98–107)
CO2 SERPL-SCNC: 28 MMOL/L (ref 22–30)
EOSINOPHIL # BLD AUTO: 0.2 K/UL (ref 0–0.7)
EOSINOPHIL NFR BLD AUTO: 4 %
ERYTHROCYTE [DISTWIDTH] IN BLOOD BY AUTOMATED COUNT: 3.74 M/UL (ref 3.8–5.4)
ERYTHROCYTE [DISTWIDTH] IN BLOOD: 12.8 % (ref 11.5–15.5)
GLUCOSE SERPL-MCNC: 109 MG/DL (ref 74–99)
HCT VFR BLD AUTO: 36.3 % (ref 34–46)
HDW: 2.56
HGB BLD-MCNC: 11.7 GM/DL (ref 11.4–16)
LUC NFR BLD AUTO: 2 %
LYMPHOCYTES # SPEC AUTO: 0.9 K/UL (ref 1–4.8)
LYMPHOCYTES NFR SPEC AUTO: 19 %
MCH RBC QN AUTO: 31.3 PG (ref 25–35)
MCHC RBC AUTO-ENTMCNC: 32.3 G/DL (ref 31–37)
MCV RBC AUTO: 96.9 FL (ref 80–100)
MONOCYTES # BLD AUTO: 0.2 K/UL (ref 0–1)
MONOCYTES NFR BLD AUTO: 5 %
NEUTROPHILS # BLD AUTO: 3.2 K/UL (ref 1.3–7.7)
NEUTROPHILS NFR BLD AUTO: 70 %
NON-AFRICAN AMERICAN GFR(MDRD): >60
POTASSIUM SERPL-SCNC: 3.9 MMOL/L (ref 3.5–5.1)
SODIUM SERPL-SCNC: 142 MMOL/L (ref 137–145)
WBC # BLD AUTO: 0.11 10*3/UL
WBC # BLD AUTO: 4.6 K/UL (ref 3.8–10.6)
WBC (PEROX): 4.7

## 2017-08-16 RX ADMIN — CEFAZOLIN SCH: 330 INJECTION, POWDER, FOR SOLUTION INTRAMUSCULAR; INTRAVENOUS at 09:16

## 2017-08-16 RX ADMIN — SODIUM CHLORIDE SCH MLS/HR: 9 INJECTION, SOLUTION INTRAVENOUS at 13:02

## 2017-08-16 RX ADMIN — ENOXAPARIN SODIUM SCH MG: 40 INJECTION SUBCUTANEOUS at 08:06

## 2017-08-16 RX ADMIN — THERA TABS SCH EACH: TAB at 13:02

## 2017-08-16 RX ADMIN — PANTOPRAZOLE SODIUM SCH MG: 40 TABLET, DELAYED RELEASE ORAL at 08:06

## 2017-08-16 RX ADMIN — LISINOPRIL SCH MG: 20 TABLET ORAL at 08:06

## 2017-08-16 RX ADMIN — LEVOTHYROXINE SODIUM SCH MCG: 100 TABLET ORAL at 06:06

## 2017-08-16 RX ADMIN — Medication SCH UNIT: at 08:06

## 2017-08-16 NOTE — PN
DATE OF SERVICE:  08/14/2017



Patient is a pleasant 72-year-old white female who is quite new to me.  She was 
admitted for cellulitis of the right buttock with some deep induration and 
purulent drainage.  She was apparently driving to [place] when she noticed 
some thing stinging her at her waistband.  Felt like something was rubbing 
where she reached back and grabbed her back and immediately became swollen and 
draining after she squeezed the area. She has been self treating this with warm 
moist compresses and became it became very tender and much more enlarged where 
she went to the [facility] this weekend.  She is not known to me as 
I am not her regular physician, but she wishes to establish in the office. I 
have agreed to take her on as a patient after seen by the hospitalist this 
weekend.  



PHYSICAL EXAM:  She is alert and oriented x3. 

Neck is supple.  No JVD.  

HEART:  Regular rate and rhythm. 

LUNGS:  Clear to auscultation. 

ABDOMEN:  Soft, nontender, no rebound, rigidity or guarding. 

EXTREMITIES:  No cyanosis, clubbing or jaundice. 

Skin with large area of erythema and induration on her right buttocks extending 
up to her right hip and cross down to her posterior thigh. There is a central 
area of purulent drainage, yellow as well. 



IMPRESSION:   Acute cellulitis abscess of her right buttocks. 



PLAN:  I&D Tuesday morning.  Leave patient n.p.o. after midnight.  Will 
continue IV vancomycin.  Will await final cultures.  Continue with infectious 
disease consultation by Dr. Davidson. 





JARVIS

## 2017-08-16 NOTE — PN
DATE OF SERVICE:  08/15/2017



The patient is a pleasant 72 -year-old white female who was admitted to the 
hospital with acute cellulitis and abscess of her right upper buttocks.  The 
patient has been placed on IV antibiotics and is doing better today with some 
drainage. However, she awaits surgery sometime today.  



PHYSICAL EXAMINATION:  Vital signs are stable. HEENT: Head is Normocephalic and 
atraumatic. Normal distribution of hair.   Pupils equal, round, reactive to 
light and accommodation.  Extraocular muscles are intact.  Neck is supple.  No 
JVD. Heart regular rate and rhythm.  Abscess right buttocks extending up to the 
right hip, waistline area and down to the lower buttocks and posterior thigh.   
Central area of induration now has hole with copious amounts of purulent 
drainage.    



IMPRESSION:  Acute cellulitis of the buttocks. 



PLAN:   Surgical debridement today by Dr. Marino.   Await cultures.  Continue 
antibiotics.   Await surgical debridement and I&D today. 
JARVIS

## 2017-08-16 NOTE — P.PN
Subjective





72-year-old female being seen on rounds this morning is up ambulating in the 

hallway "I can't believe how much less pain I have not that the areas been 

drained" patient did undergo an incision and drainage of the right flank 

abscess on the 15th.  Currently a dressing in place addressing moderate amount 

of serous drainage noted.  Patient did point that she has developed on the left 

posterior upper thigh a pimple area red.  Patient stated that the right flank 

abscess "it started out looking just like this".  Patient's currently being 

followed by infectious disease as well as surgical service





Objective





- Vital Signs


Vital signs: 


 Vital Signs











Temp  97.3 F L  08/16/17 15:00


 


Pulse  80   08/16/17 15:00


 


Resp  18   08/16/17 15:00


 


BP  122/70   08/16/17 15:00


 


Pulse Ox  98   08/16/17 15:00








 Intake & Output











 08/15/17 08/16/17 08/16/17





 18:59 06:59 18:59


 


Intake Total 400 500 


 


Output Total 10  


 


Balance 390 500 


 


Intake:   


 


    


 


  Oral  500 


 


Output:   


 


  Estimated Blood Loss 10  


 


Other:   


 


  Voiding Method  Toilet Toilet


 


  # Voids 1 1 1














- Exam





Physical exam


72-year-old female being seen this morning up ambulating in the hallway states 

there is less pain in the right flank feels better


Lungs are essentially clear with adequate air movement no cough noted


Heart S1-S2 audible and regular no murmur noted denying chest pain


Abdomen soft nontender reports no nausea vomiting


Extremities no edema noted





- Labs


CBC & Chem 7: 


 08/16/17 08:43





 08/16/17 08:43


Labs: 


 Abnormal Lab Results - Last 24 Hours (Table)











  08/16/17 08/16/17 Range/Units





  08:43 08:43 


 


RBC  3.74 L   (3.80-5.40)  m/uL


 


Lymphocytes #  0.9 L   (1.0-4.8)  k/uL


 


Glucose   109 H  (74-99)  mg/dL








 Microbiology - Last 24 Hours (Table)











 08/14/17 10:15 Anaerobic Culture - Preliminary





 Back 


 


 08/14/17 10:15 Gram Stain - Final





 Back Wound Culture - Final





    Staphylococcus aureus


 


 08/15/17 17:46 Gram Stain - Preliminary





 Abdomen Wound Culture - Preliminary


 


 08/15/17 17:46 Anaerobic Culture - Preliminary





 Abdomen 


 


 08/13/17 14:30 Blood Culture - Preliminary





 Blood    No Growth after 48 hours














Assessment and Plan


Plan: 





 impression


Present on admission febrile leukocytosis sepsis suspect due to right buttock 

abscess new onset


 hypertension essential


Hypothyroid on supplements


Present on admission right posterior buttocks area extending into right flank 

with increase redness and tenderness with purulent drainage











Plan


Surgical service to discuss with infectious disease Dr. Davidson recommendations 

about applying the wound VAC to the area will defer to infectious disease await 

the recommendations


Follow-up on pending cultures


Pain control


DVT and GI prophylaxis


Resume home meds as appropriate


IV for hydration


Continue IV vancomycin and kefzol as ordered








The above impression and plan of care have been discussed and directed by 

signing physician. Verna Van nurse practitioner acting as scribe for signing 

physician.

## 2017-08-17 VITALS
DIASTOLIC BLOOD PRESSURE: 79 MMHG | RESPIRATION RATE: 16 BRPM | SYSTOLIC BLOOD PRESSURE: 134 MMHG | TEMPERATURE: 98.8 F | HEART RATE: 68 BPM

## 2017-08-17 LAB
ANION GAP SERPL CALC-SCNC: 9 MMOL/L
BASOPHILS # BLD AUTO: 0 K/UL (ref 0–0.2)
BASOPHILS NFR BLD AUTO: 0 %
BUN SERPL-SCNC: 6 MG/DL (ref 7–17)
CALCIUM SPEC-MCNC: 8.6 MG/DL (ref 8.4–10.2)
CH: 32.1
CHCM: 32.9
CHLORIDE SERPL-SCNC: 107 MMOL/L (ref 98–107)
CO2 SERPL-SCNC: 27 MMOL/L (ref 22–30)
EOSINOPHIL # BLD AUTO: 0.2 K/UL (ref 0–0.7)
EOSINOPHIL NFR BLD AUTO: 5 %
ERYTHROCYTE [DISTWIDTH] IN BLOOD BY AUTOMATED COUNT: 3.82 M/UL (ref 3.8–5.4)
ERYTHROCYTE [DISTWIDTH] IN BLOOD: 13.1 % (ref 11.5–15.5)
GLUCOSE SERPL-MCNC: 92 MG/DL (ref 74–99)
HCT VFR BLD AUTO: 37.4 % (ref 34–46)
HDW: 2.57
HGB BLD-MCNC: 12 GM/DL (ref 11.4–16)
LUC NFR BLD AUTO: 2 %
LYMPHOCYTES # SPEC AUTO: 0.9 K/UL (ref 1–4.8)
LYMPHOCYTES NFR SPEC AUTO: 22 %
MCH RBC QN AUTO: 31.4 PG (ref 25–35)
MCHC RBC AUTO-ENTMCNC: 32.1 G/DL (ref 31–37)
MCV RBC AUTO: 97.9 FL (ref 80–100)
MONOCYTES # BLD AUTO: 0.2 K/UL (ref 0–1)
MONOCYTES NFR BLD AUTO: 5 %
NEUTROPHILS # BLD AUTO: 2.7 K/UL (ref 1.3–7.7)
NEUTROPHILS NFR BLD AUTO: 66 %
NON-AFRICAN AMERICAN GFR(MDRD): >60
POTASSIUM SERPL-SCNC: 3.6 MMOL/L (ref 3.5–5.1)
SODIUM SERPL-SCNC: 143 MMOL/L (ref 137–145)
WBC # BLD AUTO: 0.09 10*3/UL
WBC # BLD AUTO: 4 K/UL (ref 3.8–10.6)
WBC (PEROX): 4

## 2017-08-17 RX ADMIN — PANTOPRAZOLE SODIUM SCH MG: 40 TABLET, DELAYED RELEASE ORAL at 07:41

## 2017-08-17 RX ADMIN — LISINOPRIL SCH MG: 20 TABLET ORAL at 07:41

## 2017-08-17 RX ADMIN — Medication SCH UNIT: at 07:41

## 2017-08-17 RX ADMIN — LEVOTHYROXINE SODIUM SCH MCG: 100 TABLET ORAL at 06:50

## 2017-08-17 RX ADMIN — THERA TABS SCH EACH: TAB at 07:41

## 2017-08-17 RX ADMIN — ENOXAPARIN SODIUM SCH MG: 40 INJECTION SUBCUTANEOUS at 07:41

## 2017-08-17 RX ADMIN — CEFAZOLIN SCH: 330 INJECTION, POWDER, FOR SOLUTION INTRAMUSCULAR; INTRAVENOUS at 07:41

## 2017-08-17 NOTE — PN
DATE OF SERVICE:  08/16/17



The patient is a pleasant 72 -year-old white female who underwent I&D of her 
right gluteal abscess.  Culture was obtained and pending at the current time.  
A bandage was placed over a packed wound and her pressure is off this area.   
She states feels much better.  She did have a restful night. Her vital signs 
are stable and she is afebrile.   Her first culture is back with a presumptive 
Staph aureus with a few gram positive cocci in clusters. 



PHYSICAL EXAMINATION: HEENT:  She is alert and oriented times three.   Neck is 
supple.  No JVD.  Heart regular rate and rhythm.   Lungs clear to auscultation.
      Abdomen is soft.   Nontender.  No rebound, rigidity or guarding.  
Extremities no cyanosis, clubbing or jaundice. 



IMPRESSION: 

1.   Acute cellulitis abscess of the right gluteal area.  

2.   Status post I&D of this area. 

   



PLAN:   continue postoperative care.  Await final results from intraoperative 
cultures, to figure out final antibiotic choice.
JARVIS

## 2017-08-19 NOTE — DS
DATE OF ADMISSION:  08/13/2017

DATE OF DISCHARGE:  08/17/2017



Allergies to BACTRIM. 



DISCHARGE MEDICATIONS: 

1.  Keflex 500 mg q.8. 

2   Fosamax 70 mg one tablet on Tuesday. 

3.  Lipitor 10 mg q. day. 

4.  Vitamin D. 

5.  Synthroid 100 mcg every Monday, Tuesday, Wednesday, Thursday and Friday. 

6.  (        )  800 b.i.d. 

7.  Amlodipine/benazepril 5/20 one daily. 



She is to follow up with acute wound care center next.  Follow up with me in 2 
weeks. 



FINAL DIAGNOSES: 

1.  Acute cellulitis and abscess of the right buttocks. 

2.  Status post incision and drainage and packing of the abscess.

3.  Hyperlipidemia. 

4.  Hypothyroidism. 

5.  Hypertension. 

6.  Leukocytosis. 



HOSPITAL COURSE:  Julianna Perez is a pleasant 72 -year-old white female who 
was admitted to the hospital with acute abscess and cellulitis of the right 
buttocks. It started as a small ingrown hair/furuncle "pimple" in her right 
buttocks around her waistline.  It proceeded to have a lot of local discharge 
and within 24 hours swelled to beyond her control. She went to the urgent care 
center and was placed on Bactrim. The patient will be discharged with the above 
instructions, wound care and Infectious Disease continue to monitor.  
JARVIS

## 2017-08-30 NOTE — CDI
In responding to this query, please exercise your independent professional 
judgment. [facility] Coding Staff and Clinical Documentation Specialists 
appreciate your assistance in clarifying documentation, maintaining compliance 
with coding guidelines, accurately documenting patients condition and 
capturing severity of illness. The fact that a question is asked does not imply 
that any particular answer is desired or expected. Communication forms are a 
method of clarifying documentation and are not made part of the Legal Health 
Record. Thank you in advance for your clarification.

 Last Revision, November 2015



Rachelle Serrano

[address] [place] [address]



Documentation Clarification Form



Date: 8/21/2017 9:40:00 AM

From: Vannesa Traci

Phone: 

MRN: [id number]

Admit Date: 8/13/2017 3:18:00 PM

Patient Name: Julianna Perez 

Visit Number: [id number]

Discharge Date:







Dr. Patrick Evans



Sepsis is documented in Verna Van's progress notes on 8/15 and 8/16 but not in 
the discharge summary.

Patient history/risk factors:  Patient was admitted with abcess and cellulitis 
of the upper buttock.

Clinical Indicators: Febrile leukocytosis.

Lab findings:  WBC 10.7, lactic acid 1.6

Vital Signs: T. 98.1, P. 92, R. 20, /72 

Treatment:  IV Cefazolin & IV Vancomycin

Consults:  Infectious disease consult documented sepsis



In your professional opinion, can you please clarify if Sepsis was Ruled In or 
Ruled Out?

   Other

   Unable to determine





Please document in your discharge summary in order to capture severity of 
illness and risk of mortality. Include clinical findings that support your 
diagnosis.



FYI: Press F11 to launch patient chart.



If you have a question about this query, please contact Kristy Doherty Coding 
Manager at [phone] between 8am and 5pm.

JARVIS

## 2018-09-21 ENCOUNTER — HOSPITAL ENCOUNTER (OUTPATIENT)
Dept: HOSPITAL 47 - LABWHC1 | Age: 73
Discharge: HOME | End: 2018-09-21
Payer: MEDICARE

## 2018-09-21 DIAGNOSIS — E03.9: Primary | ICD-10-CM

## 2018-09-21 DIAGNOSIS — E78.5: ICD-10-CM

## 2018-09-21 DIAGNOSIS — R73.9: ICD-10-CM

## 2018-09-21 DIAGNOSIS — I10: ICD-10-CM

## 2018-09-21 LAB
ALBUMIN SERPL-MCNC: 3.8 G/DL (ref 3.5–5)
ALP SERPL-CCNC: 55 U/L (ref 38–126)
ALT SERPL-CCNC: 24 U/L (ref 9–52)
ANION GAP SERPL CALC-SCNC: 5 MMOL/L
AST SERPL-CCNC: 33 U/L (ref 14–36)
BUN SERPL-SCNC: 17 MG/DL (ref 7–17)
CALCIUM SPEC-MCNC: 9.3 MG/DL (ref 8.4–10.2)
CHLORIDE SERPL-SCNC: 107 MMOL/L (ref 98–107)
CHOLEST SERPL-MCNC: 154 MG/DL (ref ?–200)
CO2 SERPL-SCNC: 30 MMOL/L (ref 22–30)
ERYTHROCYTE [DISTWIDTH] IN BLOOD BY AUTOMATED COUNT: 4.29 M/UL (ref 3.8–5.4)
ERYTHROCYTE [DISTWIDTH] IN BLOOD: 12.4 % (ref 11.5–15.5)
GLUCOSE SERPL-MCNC: 93 MG/DL (ref 74–99)
HBA1C MFR BLD: 5.4 % (ref 4–6)
HCT VFR BLD AUTO: 41.3 % (ref 34–46)
HDLC SERPL-MCNC: 64 MG/DL (ref 40–60)
HGB BLD-MCNC: 13.3 GM/DL (ref 11.4–16)
LDLC SERPL CALC-MCNC: 81 MG/DL (ref 0–99)
MCH RBC QN AUTO: 30.9 PG (ref 25–35)
MCHC RBC AUTO-ENTMCNC: 32.1 G/DL (ref 31–37)
MCV RBC AUTO: 96.3 FL (ref 80–100)
PLATELET # BLD AUTO: 156 K/UL (ref 150–450)
POTASSIUM SERPL-SCNC: 5.2 MMOL/L (ref 3.5–5.1)
PROT SERPL-MCNC: 6.4 G/DL (ref 6.3–8.2)
SODIUM SERPL-SCNC: 142 MMOL/L (ref 137–145)
T4 FREE SERPL-MCNC: 1.53 NG/DL (ref 0.78–2.19)
TRIGL SERPL-MCNC: 47 MG/DL (ref ?–150)
WBC # BLD AUTO: 3.6 K/UL (ref 3.8–10.6)

## 2018-09-21 PROCEDURE — 84443 ASSAY THYROID STIM HORMONE: CPT

## 2018-09-21 PROCEDURE — 84439 ASSAY OF FREE THYROXINE: CPT

## 2018-09-21 PROCEDURE — 36415 COLL VENOUS BLD VENIPUNCTURE: CPT

## 2018-09-21 PROCEDURE — 80061 LIPID PANEL: CPT

## 2018-09-21 PROCEDURE — 85027 COMPLETE CBC AUTOMATED: CPT

## 2018-09-21 PROCEDURE — 80053 COMPREHEN METABOLIC PANEL: CPT

## 2018-09-21 PROCEDURE — 83036 HEMOGLOBIN GLYCOSYLATED A1C: CPT

## 2019-01-25 ENCOUNTER — HOSPITAL ENCOUNTER (OUTPATIENT)
Dept: HOSPITAL 47 - RADMAMWWP | Age: 74
Discharge: HOME | End: 2019-01-25
Payer: MEDICARE

## 2019-01-25 DIAGNOSIS — Z12.31: Primary | ICD-10-CM

## 2019-01-25 PROCEDURE — 77067 SCR MAMMO BI INCL CAD: CPT

## 2019-01-25 PROCEDURE — 77063 BREAST TOMOSYNTHESIS BI: CPT

## 2019-01-29 NOTE — MM
Reason for exam: screening  (asymptomatic).

Last mammogram was performed 1 year and 3 months ago.



History:

Patient is postmenopausal.

Family history of breast cancer in mother at age 62.

Benign excisional biopsy of the left breast, 1989.



Physical Findings:

A clinical breast exam by your physician is recommended on an annual basis and 

results should be correlated with mammographic findings.



MG 3D Screening Mammo W/Cad

Bilateral CC and MLO view(s) were taken.

Prior study comparison: November 1, 2017, bilateral MG 3d screening mammo w/cad.  

August 25, 2016, mammogram, performed at [facility].

There are scattered fibroglandular densities.  No significant changes when 

compared with prior studies.





ASSESSMENT: Negative, BI-RAD 1



RECOMMENDATION:

Routine screening mammogram of both breasts in 1 year.

## 2019-11-11 ENCOUNTER — HOSPITAL ENCOUNTER (OUTPATIENT)
Dept: HOSPITAL 47 - RADUSWWP | Age: 74
Discharge: HOME | End: 2019-11-11
Payer: MEDICARE

## 2019-11-11 DIAGNOSIS — R94.5: Primary | ICD-10-CM

## 2019-11-11 PROCEDURE — 76705 ECHO EXAM OF ABDOMEN: CPT

## 2019-11-11 NOTE — US
EXAMINATION TYPE: US liver

 

DATE OF EXAM: 11/11/2019

 

COMPARISON: NONE

 

CLINICAL HISTORY: R94.5 Elevated LFTs. Asymptomatic

 

EXAM MEASUREMENTS:

 

Liver Length:  14.9 cm   

Gallbladder Wall:  0.2 cm   

CBD:  0.7 cm

Right Kidney:  9.8 x 4.4 x 4.7 cm

 

 

 

Pancreas:  wnl

Liver:  wnl  

Gallbladder:  fold seen, wnl

**Evidence for sonographic Barry's sign:  no

CBD:  wnl 

Right Kidney:  wnl 

 

Liver is nonenlarged and appears homogeneous. No cholelithiasis or pericholecystic fluid. Right kidne
y demonstrates no evidence of hydronephrosis or nephrolithiasis. Visualized portions of the pancreas 
are unremarkable with suboptimal visualization of the pancreatic head and tail given overlying bowel 
gas.

 

IMPRESSION: Homogeneous hepatic echotexture despite the known abnormal liver function tests. Common b
ile duct is upper limits of normal however no other sonographic evidence of acute cholecystitis.

## 2019-12-17 ENCOUNTER — HOSPITAL ENCOUNTER (OUTPATIENT)
Dept: HOSPITAL 47 - LABWHC1 | Age: 74
Discharge: HOME | End: 2019-12-17
Payer: MEDICARE

## 2019-12-17 DIAGNOSIS — R94.5: Primary | ICD-10-CM

## 2019-12-17 DIAGNOSIS — R04.0: ICD-10-CM

## 2019-12-17 LAB
ALBUMIN SERPL-MCNC: 4.4 G/DL (ref 3.8–4.9)
ALBUMIN/GLOB SERPL: 2.59 G/DL (ref 1.6–3.17)
ALP SERPL-CCNC: 56 U/L (ref 41–126)
ALT SERPL-CCNC: 175 U/L (ref 8–44)
AST SERPL-CCNC: 166 U/L (ref 13–35)
BILIRUB INDIRECT SERPL-MCNC: 0.5 MG/DL
ERYTHROCYTE [DISTWIDTH] IN BLOOD BY AUTOMATED COUNT: 4.21 M/UL (ref 3.8–5.4)
ERYTHROCYTE [DISTWIDTH] IN BLOOD: 12.6 % (ref 11.5–15.5)
FERRITIN SERPL-MCNC: 74.9 NG/ML (ref 10–291)
GLOBULIN SER CALC-MCNC: 1.7 G/DL (ref 1.6–3.3)
HCT VFR BLD AUTO: 41 % (ref 34–46)
HGB BLD-MCNC: 13.8 GM/DL (ref 11.4–16)
INR PPP: 1 (ref ?–1.2)
IRON SERPL-MCNC: 93 UG/DL (ref 50–170)
MCH RBC QN AUTO: 32.8 PG (ref 25–35)
MCHC RBC AUTO-ENTMCNC: 33.7 G/DL (ref 31–37)
MCV RBC AUTO: 97.4 FL (ref 80–100)
PLATELET # BLD AUTO: 201 K/UL (ref 150–450)
PROT SERPL-MCNC: 6.1 G/DL (ref 6.2–8.2)
PT BLD: 10.4 SEC (ref 9–12)
TIBC SERPL-MCNC: 328 UG/DL (ref 228–460)
WBC # BLD AUTO: 4.6 K/UL (ref 3.8–10.6)

## 2019-12-17 PROCEDURE — 80076 HEPATIC FUNCTION PANEL: CPT

## 2019-12-17 PROCEDURE — 36415 COLL VENOUS BLD VENIPUNCTURE: CPT

## 2019-12-17 PROCEDURE — 85027 COMPLETE CBC AUTOMATED: CPT

## 2019-12-17 PROCEDURE — 83516 IMMUNOASSAY NONANTIBODY: CPT

## 2019-12-17 PROCEDURE — 86038 ANTINUCLEAR ANTIBODIES: CPT

## 2019-12-17 PROCEDURE — 82728 ASSAY OF FERRITIN: CPT

## 2019-12-17 PROCEDURE — 82390 ASSAY OF CERULOPLASMIN: CPT

## 2019-12-17 PROCEDURE — 85610 PROTHROMBIN TIME: CPT

## 2019-12-17 PROCEDURE — 83550 IRON BINDING TEST: CPT

## 2019-12-17 PROCEDURE — 83540 ASSAY OF IRON: CPT

## 2020-01-28 ENCOUNTER — HOSPITAL ENCOUNTER (OUTPATIENT)
Dept: HOSPITAL 47 - RADMAMWWP | Age: 75
Discharge: HOME | End: 2020-01-28
Payer: MEDICARE

## 2020-01-28 DIAGNOSIS — Z12.31: Primary | ICD-10-CM

## 2020-01-28 DIAGNOSIS — Z80.3: ICD-10-CM

## 2020-01-28 PROCEDURE — 77067 SCR MAMMO BI INCL CAD: CPT

## 2020-01-28 PROCEDURE — 77063 BREAST TOMOSYNTHESIS BI: CPT

## 2020-01-29 NOTE — MM
Reason for exam: screening  (asymptomatic).

Last mammogram was performed 1 year ago.



History:

Patient is postmenopausal.

Family history of breast cancer in mother at age 62.

Benign excisional biopsy of the left breast, 1989.



Physical Findings:

A clinical breast exam by your physician is recommended on an annual basis and 

results should be correlated with mammographic findings.



MG 3D Screening Mammo W/Cad

Bilateral CC and MLO view(s) were taken.

Prior study comparison: January 25, 2019, bilateral MG 3d screening mammo w/cad.  

November 1, 2017, bilateral MG 3d screening mammo w/cad.

There are scattered fibroglandular densities.  No suspicious abnormality.  No 

significant changes when compared with prior studies.





ASSESSMENT: Negative, BI-RAD 1



RECOMMENDATION:

Routine screening mammogram of both breasts in 1 year.

## 2020-05-26 ENCOUNTER — HOSPITAL ENCOUNTER (OUTPATIENT)
Dept: HOSPITAL 47 - LABWHC1 | Age: 75
Discharge: HOME | End: 2020-05-26
Payer: MEDICARE

## 2020-05-26 DIAGNOSIS — R94.5: Primary | ICD-10-CM

## 2020-05-26 DIAGNOSIS — E78.00: ICD-10-CM

## 2020-05-26 LAB
ALBUMIN SERPL-MCNC: 4.3 G/DL (ref 3.8–4.9)
ALBUMIN/GLOB SERPL: 2.26 G/DL (ref 1.6–3.17)
ALP SERPL-CCNC: 57 U/L (ref 41–126)
ALT SERPL-CCNC: 69 U/L (ref 8–44)
AST SERPL-CCNC: 90 U/L (ref 13–35)
BILIRUB INDIRECT SERPL-MCNC: 0.6 MG/DL
CHOLEST SERPL-MCNC: 242 MG/DL (ref 0–200)
ERYTHROCYTE [DISTWIDTH] IN BLOOD BY AUTOMATED COUNT: 4.45 M/UL (ref 3.8–5.4)
ERYTHROCYTE [DISTWIDTH] IN BLOOD: 12.5 % (ref 11.5–15.5)
GLOBULIN SER CALC-MCNC: 1.9 G/DL (ref 1.6–3.3)
HCT VFR BLD AUTO: 43.8 % (ref 34–46)
HDLC SERPL-MCNC: 74 MG/DL (ref 40–60)
HGB BLD-MCNC: 14.3 GM/DL (ref 11.4–16)
INR PPP: 1 (ref ?–1.2)
LDLC SERPL CALC-MCNC: 157 MG/DL (ref 0–131)
MCH RBC QN AUTO: 32.1 PG (ref 25–35)
MCHC RBC AUTO-ENTMCNC: 32.6 G/DL (ref 31–37)
MCV RBC AUTO: 98.4 FL (ref 80–100)
PLATELET # BLD AUTO: 192 K/UL (ref 150–450)
PROT SERPL-MCNC: 6.2 G/DL (ref 6.2–8.2)
PT BLD: 10.4 SEC (ref 9–12)
TRIGL SERPL-MCNC: 55 MG/DL (ref 0–149)
VLDLC SERPL CALC-MCNC: 11 MG/DL (ref 5–40)
WBC # BLD AUTO: 4.4 K/UL (ref 3.8–10.6)

## 2020-05-26 PROCEDURE — 85027 COMPLETE CBC AUTOMATED: CPT

## 2020-05-26 PROCEDURE — 85610 PROTHROMBIN TIME: CPT

## 2020-05-26 PROCEDURE — 36415 COLL VENOUS BLD VENIPUNCTURE: CPT

## 2020-05-26 PROCEDURE — 80076 HEPATIC FUNCTION PANEL: CPT

## 2020-05-26 PROCEDURE — 80061 LIPID PANEL: CPT
